# Patient Record
(demographics unavailable — no encounter records)

---

## 2025-03-17 NOTE — HISTORY OF PRESENT ILLNESS
[FreeTextEntry8] : Pt c/o chronic GERD for years, was manageable in diana past. Recently worsened in past 2 months, more frequent and duration longer. Worse if he drinks ETOH but pt has tried stopping ETOH w/ no improvement. Pt took pepcid which helped slightly. Pt had EGD 5-6 yrs ago which was wnl. Drinks 2-3 cups coffee/day. Denies carbonated drink intake, spicy foods.

## 2025-03-17 NOTE — ASSESSMENT
[FreeTextEntry1] : GERD- worsened recently. start course of protonix 20mg bid. advised dietary modifications to decrease caffeine intake, chocolate, spicy or acidic foods/drinks. if no improvement see gastro

## 2025-05-20 NOTE — PHYSICAL EXAM
[Alert] : alert [Oriented x 3] : ~L oriented x 3 [Well Nourished] : well nourished [FreeTextEntry3] : The following areas were examined and no significant abnormalities were seen except as noted below:  Type II skin  scalp, face, eyelids, nose, lips, ears, neck, chest, abdomen, back,groin, buttocks, right arm, left arm, right hand, left hand, right  leg, left leg, right foot, left foot  Left temple: Diffuse ill-defined pink scaly patch with more discrete 5 x 5 mm pink scaly macule [place] Right mid forearm: 5 x 5 mm pink scaly slightly elevated papule Trunk: Multiple small brown macules-especially back with a few on the abdomen Right lower lateral back: 6 x 3 mm brown patch (lesion measured 5 x 3 mm on the visit of May 23, 2024)-not suspicious on dermoscopy Back: Rare tan verrucous plaques  No other suspicious lesions seen

## 2025-05-20 NOTE — HISTORY OF PRESENT ILLNESS
[FreeTextEntry1] : Evaluation of growths [de-identified] : Follow-up visit for 53-year-old white male last seen by me on May 23, 2024, for evaluation of growths.   Particularly concerned about lesions on the left temple and right mid forearm  No history of skin cancer.  Patient has history of actinic keratoses treated with cryosurgery and has a history of borderline dysplastic nevi.

## 2025-05-20 NOTE — PLAN
[TextEntry] : Will continue to observe the slightly dysplastic melanocytic nevi Will continue to observe the bulk of the actinic keratoses Treat 2 more discrete lesions on the left temple and lesion on right mid forearm with cryosurgery  Verbal consent obtained.  Potential risks including oozing, blister formation, post-inflammatory hypo/hyperpigmentation discussed.  Cryosurgery using liquid nitrogen spray applied for 7 seconds X 2 given to 3 lesions on the left temple x 2 and right mid forearm  Patient told the wound(s) may develop oozing, crusting or blisters  Return 1 year

## 2025-05-20 NOTE — ASSESSMENT
[FreeTextEntry1] : Actinic keratoses on the left temple and right forearm Melanocytic nevi-slightly dysplastic on back Seborrheic keratoses on back as well